# Patient Record
Sex: MALE | Race: WHITE | Employment: STUDENT | URBAN - METROPOLITAN AREA
[De-identification: names, ages, dates, MRNs, and addresses within clinical notes are randomized per-mention and may not be internally consistent; named-entity substitution may affect disease eponyms.]

---

## 2017-09-28 ENCOUNTER — OFFICE VISIT (OUTPATIENT)
Dept: PRIMARY CARE CLINIC | Age: 22
End: 2017-09-28

## 2017-09-28 VITALS
DIASTOLIC BLOOD PRESSURE: 70 MMHG | HEART RATE: 70 BPM | RESPIRATION RATE: 14 BRPM | HEIGHT: 70 IN | SYSTOLIC BLOOD PRESSURE: 128 MMHG | TEMPERATURE: 98.5 F | BODY MASS INDEX: 27.35 KG/M2 | WEIGHT: 191 LBS

## 2017-09-28 DIAGNOSIS — F98.8 ADD (ATTENTION DEFICIT DISORDER): Primary | ICD-10-CM

## 2017-09-28 PROCEDURE — 99213 OFFICE O/P EST LOW 20 MIN: CPT | Performed by: FAMILY MEDICINE

## 2017-09-28 ASSESSMENT — ENCOUNTER SYMPTOMS
EYE PAIN: 0
COLOR CHANGE: 0
FACIAL SWELLING: 0
EYE DISCHARGE: 0
CONSTIPATION: 0
DIARRHEA: 0
PHOTOPHOBIA: 0
EYE REDNESS: 0
APNEA: 0
CHEST TIGHTNESS: 0
STRIDOR: 0
NAUSEA: 0
CHOKING: 0
ABDOMINAL PAIN: 0
WHEEZING: 0

## 2017-09-28 ASSESSMENT — PATIENT HEALTH QUESTIONNAIRE - PHQ9
SUM OF ALL RESPONSES TO PHQ QUESTIONS 1-9: 0
2. FEELING DOWN, DEPRESSED OR HOPELESS: 0
SUM OF ALL RESPONSES TO PHQ9 QUESTIONS 1 & 2: 0
1. LITTLE INTEREST OR PLEASURE IN DOING THINGS: 0

## 2017-10-12 ENCOUNTER — TELEPHONE (OUTPATIENT)
Dept: PRIMARY CARE CLINIC | Age: 22
End: 2017-10-12

## 2017-10-12 RX ORDER — DEXTROAMPHETAMINE SACCHARATE, AMPHETAMINE ASPARTATE MONOHYDRATE, DEXTROAMPHETAMINE SULFATE AND AMPHETAMINE SULFATE 5; 5; 5; 5 MG/1; MG/1; MG/1; MG/1
20 CAPSULE, EXTENDED RELEASE ORAL EVERY MORNING
Qty: 30 CAPSULE | Refills: 0 | Status: SHIPPED | OUTPATIENT
Start: 2017-10-12 | End: 2019-02-12

## 2017-10-12 NOTE — TELEPHONE ENCOUNTER
Pt's mother is calling. Pt needs a prior auth for Vyvanse. Even with the coupon, it's over $100/mo. Ralph Terrance is having a very hard time focusing in school, she would like to get him back on Adderall until we can get a PA for the Vyvanse so that he can be on something, he's struggling.

## 2017-10-13 ENCOUNTER — TELEPHONE (OUTPATIENT)
Dept: PRIMARY CARE CLINIC | Age: 22
End: 2017-10-13

## 2017-10-17 ENCOUNTER — TELEPHONE (OUTPATIENT)
Dept: PRIMARY CARE CLINIC | Age: 22
End: 2017-10-17

## 2017-10-17 NOTE — TELEPHONE ENCOUNTER
Outcome   Approvedtoday   CaseId:[id number];Product Name:ST: ADHD Stimulants GF - BRAND NAME [Adderall XR, Adzenys XR-ODT, Aptensio XR, Concerta, Cotempla XR-ODT, Daytrana, Dexedrine Spansules, Dyanavel XR, Focalin XR, Metadate CD, Mydayis, QuilliChew ER, Quillivant XR, Ritalin LA, Ritalin SR, Vyvanse] - MCKENNA;Status:Approved; Coverage Start Date:09/17/2017;Coverage End Date:10/17/2018;   DrugVyvanse 40MG capsules   [employer] Electronic PA Form   Original Claim 40 [facility]

## 2017-11-30 RX ORDER — DEXTROAMPHETAMINE SACCHARATE, AMPHETAMINE ASPARTATE, DEXTROAMPHETAMINE SULFATE AND AMPHETAMINE SULFATE 5; 5; 5; 5 MG/1; MG/1; MG/1; MG/1
20 TABLET ORAL DAILY
Qty: 30 TABLET | Refills: 0 | Status: CANCELLED | OUTPATIENT
Start: 2018-01-30

## 2017-11-30 RX ORDER — DEXTROAMPHETAMINE SACCHARATE, AMPHETAMINE ASPARTATE, DEXTROAMPHETAMINE SULFATE AND AMPHETAMINE SULFATE 5; 5; 5; 5 MG/1; MG/1; MG/1; MG/1
20 TABLET ORAL DAILY
Qty: 30 TABLET | Refills: 0 | Status: CANCELLED | OUTPATIENT
Start: 2017-12-30

## 2017-11-30 RX ORDER — DEXTROAMPHETAMINE SACCHARATE, AMPHETAMINE ASPARTATE, DEXTROAMPHETAMINE SULFATE AND AMPHETAMINE SULFATE 5; 5; 5; 5 MG/1; MG/1; MG/1; MG/1
20 TABLET ORAL DAILY
Qty: 30 TABLET | Refills: 0 | Status: CANCELLED | OUTPATIENT
Start: 2017-11-30

## 2017-11-30 NOTE — TELEPHONE ENCOUNTER
Unable to leave a message. Voicemail full. Per Dr. Jami Nagy, find out if patient is taking Vyvanse or Adderall. Last script was Adderall prescribed 10/12/17. [place] does not show that this script was filled. [place] shows Vyvanse script filled on 10/18/17. Telephone message from 10/17/17 shows that prior auth was approved for Vyvanse as well. Dr. Jami Nagy prefers Vyvanse over Adderall.

## 2017-11-30 NOTE — TELEPHONE ENCOUNTER
His mom was confused and she had called but when she asked Rosa Rodriguez it was the vyvanse he needs. She said she is so sorry. Her # is [phone].

## 2019-02-12 ENCOUNTER — OFFICE VISIT (OUTPATIENT)
Dept: PRIMARY CARE CLINIC | Age: 24
End: 2019-02-12
Payer: COMMERCIAL

## 2019-02-12 VITALS
DIASTOLIC BLOOD PRESSURE: 76 MMHG | SYSTOLIC BLOOD PRESSURE: 114 MMHG | WEIGHT: 182 LBS | BODY MASS INDEX: 26.05 KG/M2 | HEIGHT: 70 IN | TEMPERATURE: 97.9 F | HEART RATE: 64 BPM | OXYGEN SATURATION: 98 %

## 2019-02-12 DIAGNOSIS — H60.12 CELLULITIS OF LEFT EARLOBE: ICD-10-CM

## 2019-02-12 DIAGNOSIS — F98.8 ATTENTION DEFICIT DISORDER, UNSPECIFIED HYPERACTIVITY PRESENCE: Primary | ICD-10-CM

## 2019-02-12 DIAGNOSIS — H60.02 ABSCESS OF LEFT EXTERNAL EAR: ICD-10-CM

## 2019-02-12 PROCEDURE — 99214 OFFICE O/P EST MOD 30 MIN: CPT | Performed by: FAMILY MEDICINE

## 2019-02-12 PROCEDURE — 96372 THER/PROPH/DIAG INJ SC/IM: CPT | Performed by: FAMILY MEDICINE

## 2019-02-12 RX ORDER — CEFTRIAXONE 1 G/1
1 INJECTION, POWDER, FOR SOLUTION INTRAMUSCULAR; INTRAVENOUS ONCE
Status: COMPLETED | OUTPATIENT
Start: 2019-02-12 | End: 2019-02-12

## 2019-02-12 RX ORDER — AMOXICILLIN AND CLAVULANATE POTASSIUM 875; 125 MG/1; MG/1
1 TABLET, FILM COATED ORAL 2 TIMES DAILY
Qty: 20 TABLET | Refills: 0 | Status: SHIPPED | OUTPATIENT
Start: 2019-02-12 | End: 2019-02-22

## 2019-02-12 RX ADMIN — CEFTRIAXONE 1 G: 1 INJECTION, POWDER, FOR SOLUTION INTRAMUSCULAR; INTRAVENOUS at 16:20

## 2019-02-12 ASSESSMENT — ENCOUNTER SYMPTOMS
PHOTOPHOBIA: 0
NAUSEA: 0
STRIDOR: 0
WHEEZING: 0
EYE PAIN: 0
COLOR CHANGE: 0
FACIAL SWELLING: 0
EYE REDNESS: 0
CHEST TIGHTNESS: 0
ABDOMINAL PAIN: 0
DIARRHEA: 0
EYE DISCHARGE: 0
APNEA: 0
CHOKING: 0
CONSTIPATION: 0

## 2019-02-12 ASSESSMENT — PATIENT HEALTH QUESTIONNAIRE - PHQ9
2. FEELING DOWN, DEPRESSED OR HOPELESS: 0
SUM OF ALL RESPONSES TO PHQ9 QUESTIONS 1 & 2: 0
SUM OF ALL RESPONSES TO PHQ QUESTIONS 1-9: 0
1. LITTLE INTEREST OR PLEASURE IN DOING THINGS: 0
SUM OF ALL RESPONSES TO PHQ QUESTIONS 1-9: 0

## 2019-03-04 ENCOUNTER — TELEPHONE (OUTPATIENT)
Dept: PRIMARY CARE CLINIC | Age: 24
End: 2019-03-04

## 2019-03-04 DIAGNOSIS — F90.9 ATTENTION DEFICIT HYPERACTIVITY DISORDER (ADHD), UNSPECIFIED ADHD TYPE: Primary | ICD-10-CM

## 2019-03-08 RX ORDER — METHYLPHENIDATE HYDROCHLORIDE 36 MG/1
36 TABLET ORAL DAILY
Qty: 30 TABLET | Refills: 0 | Status: SHIPPED | OUTPATIENT
Start: 2019-03-08 | End: 2019-04-07

## 2023-06-26 DIAGNOSIS — L23.7 POISON IVY: ICD-10-CM

## 2023-06-26 NOTE — TELEPHONE ENCOUNTER
PT HAS POISON IVY ON FACE, NECK AND HANDS AND IS REQUESTING A CREAM OR AN ANTIBIOTIC, ANYTHING THAT WILL HELP WITH ITCHING AND MAKE IT GO AWAY.  [facility] [place] ON [address]  [address] [address]

## 2023-06-27 RX ORDER — PREDNISONE 10 MG/1
TABLET ORAL
Qty: 20 TABLET | Refills: 0 | Status: SHIPPED | OUTPATIENT
Start: 2023-06-27

## 2023-06-27 RX ORDER — LEVOCETIRIZINE DIHYDROCHLORIDE 5 MG/1
5 TABLET, FILM COATED ORAL EVERY EVENING
Qty: 30 TABLET | Refills: 11 | Status: SHIPPED | OUTPATIENT
Start: 2023-06-27 | End: 2024-06-26

## 2024-07-01 PROBLEM — F98.8 ADD (ATTENTION DEFICIT DISORDER): Status: ACTIVE | Noted: 2017-09-28

## 2024-07-01 PROBLEM — M92.523 BILATERAL OSGOOD-SCHLATTER'S DISEASE: Status: ACTIVE | Noted: 2024-07-01

## 2024-07-02 ENCOUNTER — APPOINTMENT (OUTPATIENT)
Dept: PRIMARY CARE | Facility: CLINIC | Age: 29
End: 2024-07-02
Payer: COMMERCIAL

## 2024-07-02 VITALS
WEIGHT: 196 LBS | BODY MASS INDEX: 28.06 KG/M2 | OXYGEN SATURATION: 99 % | SYSTOLIC BLOOD PRESSURE: 126 MMHG | HEART RATE: 102 BPM | HEIGHT: 70 IN | DIASTOLIC BLOOD PRESSURE: 74 MMHG

## 2024-07-02 DIAGNOSIS — F90.0 ATTENTION DEFICIT HYPERACTIVITY DISORDER (ADHD), PREDOMINANTLY INATTENTIVE TYPE: ICD-10-CM

## 2024-07-02 DIAGNOSIS — L72.3 INFLAMED SEBACEOUS CYST: Primary | ICD-10-CM

## 2024-07-02 PROCEDURE — 99213 OFFICE O/P EST LOW 20 MIN: CPT | Performed by: FAMILY MEDICINE

## 2024-07-02 RX ORDER — CEPHALEXIN 500 MG/1
500 CAPSULE ORAL 3 TIMES DAILY
Qty: 42 CAPSULE | Refills: 0 | Status: SHIPPED | OUTPATIENT
Start: 2024-07-02 | End: 2024-07-16

## 2024-07-02 RX ORDER — LISDEXAMFETAMINE DIMESYLATE 30 MG/1
30 CAPSULE ORAL EVERY MORNING
Qty: 30 CAPSULE | Refills: 0 | Status: SHIPPED | OUTPATIENT
Start: 2024-07-02 | End: 2024-08-01

## 2024-07-02 ASSESSMENT — PATIENT HEALTH QUESTIONNAIRE - PHQ9
2. FEELING DOWN, DEPRESSED OR HOPELESS: NOT AT ALL
SUM OF ALL RESPONSES TO PHQ9 QUESTIONS 1 AND 2: 0
1. LITTLE INTEREST OR PLEASURE IN DOING THINGS: NOT AT ALL

## 2024-07-02 ASSESSMENT — ENCOUNTER SYMPTOMS
SLEEP DISTURBANCE: 0
SPEECH DIFFICULTY: 0
CONSTIPATION: 0
CONFUSION: 0
OCCASIONAL FEELINGS OF UNSTEADINESS: 0
FEVER: 0
FATIGUE: 0
DEPRESSION: 0
STRIDOR: 0
ABDOMINAL DISTENTION: 0
ADENOPATHY: 0
RECTAL PAIN: 0
HEMATURIA: 0
CHEST TIGHTNESS: 0
SHORTNESS OF BREATH: 0
MYALGIAS: 0
COLOR CHANGE: 0
DYSURIA: 0
POLYDIPSIA: 0
SEIZURES: 0
PALPITATIONS: 0
NERVOUS/ANXIOUS: 0
DECREASED CONCENTRATION: 0
ACTIVITY CHANGE: 0
POLYPHAGIA: 0
ARTHRALGIAS: 0
DIARRHEA: 0
LOSS OF SENSATION IN FEET: 0
AGITATION: 0
EYE PAIN: 0
CONSTITUTIONAL NEGATIVE: 1
DIZZINESS: 0
SORE THROAT: 0
FLANK PAIN: 0
PHOTOPHOBIA: 0
ABDOMINAL PAIN: 0
RHINORRHEA: 0
APPETITE CHANGE: 0
SINUS PRESSURE: 0
HEADACHES: 0
SINUS PAIN: 0
TROUBLE SWALLOWING: 0
DYSPHORIC MOOD: 0
COUGH: 0
NECK STIFFNESS: 0
BLOOD IN STOOL: 0

## 2024-07-02 NOTE — PROGRESS NOTES
Subjective   Patient ID: Barrie Poe is a 29 y.o. male who presents for Mass and ADHD.    Patient presents today for evaluation of possible cyst on left ear. Onset was several years ago. Had it drained approximately 2 years ago. Complains of redness, increase in size, and pain.     Patient would like to discuss restarting Vyvanse. It has been a few years since being on medication. Does not remember the dose. States he is having difficulty concentrating on studying for this big test he has coming up.       Review of Systems   Constitutional: Negative.  Negative for activity change, appetite change, fatigue and fever.   HENT:  Negative for congestion, dental problem, ear discharge, ear pain, mouth sores, rhinorrhea, sinus pressure, sinus pain, sore throat, tinnitus and trouble swallowing.    Eyes:  Negative for photophobia, pain and visual disturbance.   Respiratory:  Negative for cough, chest tightness, shortness of breath and stridor.    Cardiovascular:  Negative for chest pain and palpitations.   Gastrointestinal:  Negative for abdominal distention, abdominal pain, blood in stool, constipation, diarrhea and rectal pain.   Endocrine: Negative for cold intolerance, heat intolerance, polydipsia, polyphagia and polyuria.   Genitourinary:  Negative for dysuria, flank pain, hematuria and urgency.   Musculoskeletal:  Negative for arthralgias, gait problem, myalgias and neck stiffness.   Skin:  Negative for color change and rash.        Left preauricular cyst without tenderness   Allergic/Immunologic: Negative for environmental allergies and food allergies.   Neurological:  Negative for dizziness, seizures, syncope, speech difficulty and headaches.   Hematological:  Negative for adenopathy.   Psychiatric/Behavioral:  Negative for agitation, confusion, decreased concentration, dysphoric mood and sleep disturbance. The patient is not nervous/anxious.        Objective   /74 (BP Location: Right arm, Patient  "Position: Sitting, BP Cuff Size: Adult)   Pulse 102   Ht 1.778 m (5' 10\")   Wt 88.9 kg (196 lb)   SpO2 99%   BMI 28.12 kg/m²     Physical Exam  Vitals reviewed.   Constitutional:       General: He is not in acute distress.     Appearance: Normal appearance. He is not ill-appearing or diaphoretic.   HENT:      Head: Normocephalic.      Right Ear: Tympanic membrane and external ear normal.      Left Ear: Tympanic membrane and external ear normal.      Nose: Nose normal. No congestion.      Mouth/Throat:      Pharynx: No posterior oropharyngeal erythema.   Eyes:      General:         Right eye: No discharge.         Left eye: No discharge.      Extraocular Movements: Extraocular movements intact.      Conjunctiva/sclera: Conjunctivae normal.      Pupils: Pupils are equal, round, and reactive to light.   Cardiovascular:      Rate and Rhythm: Normal rate and regular rhythm.      Pulses: Normal pulses.      Heart sounds: Normal heart sounds. No murmur heard.  Pulmonary:      Effort: Pulmonary effort is normal. No respiratory distress.      Breath sounds: Normal breath sounds. No wheezing or rales.   Chest:      Chest wall: No tenderness.   Abdominal:      General: Abdomen is flat. Bowel sounds are normal. There is no distension.      Palpations: There is no mass.      Tenderness: There is no abdominal tenderness. There is no guarding.   Musculoskeletal:         General: No tenderness. Normal range of motion.      Cervical back: Normal range of motion and neck supple. No tenderness.      Right lower leg: No edema.      Left lower leg: No edema.   Skin:     General: Skin is dry.      Coloration: Skin is not jaundiced.      Findings: Lesion present. No bruising, erythema or rash.      Comments: 2.0 x 2.5 left preauricular sebaceous cyst without inflammation or pointing   Neurological:      General: No focal deficit present.      Mental Status: He is alert and oriented to person, place, and time. Mental status is at " baseline.      Cranial Nerves: No cranial nerve deficit.      Sensory: No sensory deficit.      Coordination: Coordination normal.      Gait: Gait normal.   Psychiatric:         Mood and Affect: Mood normal.         Thought Content: Thought content normal.         Judgment: Judgment normal.         Assessment/Plan   Problem List Items Addressed This Visit             ICD-10-CM    ADD (attention deficit disorder) F98.8    Relevant Medications    lisdexamfetamine (Vyvanse) 30 mg capsule     Other Visit Diagnoses         Codes    Inflamed sebaceous cyst    -  Primary L72.3          Scribe Attestation  By signing my name below, I, Moncho Collier DO , Scribe   attest that this documentation has been prepared under the direction and in the presence of Moncho Collier DO.    Provider Attestation - Scribe documentation    All medical record entries made by the Scribe were at my direction and personally dictated by me. I have reviewed the chart and agree that the record accurately reflects my personal performance of the history, physical exam, discussion and plan.

## 2024-07-02 NOTE — PATIENT INSTRUCTIONS
Follow up in 3 months    Continue current medications and therapy for chronic medical conditions.    Patient was advised importance of proper diet/nutrition in addition adequate hydration. Patient was encouraged moderate exercise program to include 30 minutes daily for 5 days of the week or 150 minutes weekly. Patient will follow-up with us as scheduled.    Start keflex 500 mg three times daily    Consider the I&D/left preauricular sebaceous cyst

## 2024-08-02 ENCOUNTER — APPOINTMENT (OUTPATIENT)
Dept: PRIMARY CARE | Facility: CLINIC | Age: 29
End: 2024-08-02
Payer: COMMERCIAL

## 2024-11-27 DIAGNOSIS — F90.0 ATTENTION DEFICIT HYPERACTIVITY DISORDER (ADHD), PREDOMINANTLY INATTENTIVE TYPE: ICD-10-CM

## 2024-11-27 RX ORDER — LISDEXAMFETAMINE DIMESYLATE 30 MG/1
30 CAPSULE ORAL EVERY MORNING
Qty: 30 CAPSULE | Refills: 0 | Status: SHIPPED | OUTPATIENT
Start: 2024-11-27 | End: 2024-12-27

## 2024-11-27 NOTE — TELEPHONE ENCOUNTER
[id number] - [facility] [address] [place] [address] [address]   Pt needs refill on  lisdexamfetamine (Vyvanse) 30 mg capsule

## 2025-05-02 ENCOUNTER — OFFICE VISIT (OUTPATIENT)
Dept: PRIMARY CARE | Facility: CLINIC | Age: 30
End: 2025-05-02
Payer: COMMERCIAL

## 2025-05-02 VITALS
HEIGHT: 70 IN | TEMPERATURE: 98.5 F | DIASTOLIC BLOOD PRESSURE: 80 MMHG | WEIGHT: 205 LBS | SYSTOLIC BLOOD PRESSURE: 129 MMHG | BODY MASS INDEX: 29.35 KG/M2 | RESPIRATION RATE: 16 BRPM | OXYGEN SATURATION: 99 % | HEART RATE: 82 BPM

## 2025-05-02 DIAGNOSIS — R35.0 FREQUENCY OF URINATION: ICD-10-CM

## 2025-05-02 DIAGNOSIS — R10.2 PERINEUM PAIN, MALE: ICD-10-CM

## 2025-05-02 DIAGNOSIS — R39.9 SYMPTOMS OF URINARY TRACT INFECTION: Primary | ICD-10-CM

## 2025-05-02 LAB
POC APPEARANCE, URINE: CLEAR
POC BILIRUBIN, URINE: NEGATIVE
POC BLOOD, URINE: NEGATIVE
POC COLOR, URINE: NORMAL
POC GLUCOSE, URINE: NEGATIVE MG/DL
POC KETONES, URINE: NEGATIVE MG/DL
POC LEUKOCYTES, URINE: NEGATIVE
POC NITRITE,URINE: NEGATIVE
POC PH, URINE: 7.5 PH
POC PROTEIN, URINE: NEGATIVE MG/DL
POC SPECIFIC GRAVITY, URINE: 1.01
POC UROBILINOGEN, URINE: 0.2 EU/DL

## 2025-05-02 PROCEDURE — 99213 OFFICE O/P EST LOW 20 MIN: CPT | Performed by: NURSE PRACTITIONER

## 2025-05-02 PROCEDURE — 81003 URINALYSIS AUTO W/O SCOPE: CPT | Performed by: NURSE PRACTITIONER

## 2025-05-02 RX ORDER — CEPHALEXIN 500 MG/1
500 CAPSULE ORAL 2 TIMES DAILY
Qty: 20 CAPSULE | Refills: 0 | Status: SHIPPED | OUTPATIENT
Start: 2025-05-02 | End: 2025-05-12

## 2025-05-02 ASSESSMENT — PATIENT HEALTH QUESTIONNAIRE - PHQ9
1. LITTLE INTEREST OR PLEASURE IN DOING THINGS: NOT AT ALL
1. LITTLE INTEREST OR PLEASURE IN DOING THINGS: NOT AT ALL
SUM OF ALL RESPONSES TO PHQ9 QUESTIONS 1 AND 2: 0
2. FEELING DOWN, DEPRESSED OR HOPELESS: NOT AT ALL
2. FEELING DOWN, DEPRESSED OR HOPELESS: NOT AT ALL
SUM OF ALL RESPONSES TO PHQ9 QUESTIONS 1 AND 2: 0

## 2025-05-02 ASSESSMENT — PAIN SCALES - GENERAL: PAINLEVEL_OUTOF10: 0-NO PAIN

## 2025-05-02 ASSESSMENT — ENCOUNTER SYMPTOMS
DEPRESSION: 0
LOSS OF SENSATION IN FEET: 0
OCCASIONAL FEELINGS OF UNSTEADINESS: 0

## 2025-05-02 NOTE — PROGRESS NOTES
"Subjective   Patient ID: Barrie Poe is a 29 y.o. male who presents for No chief complaint on file..      Symptoms: pain in between testes and anus sharp pain, pain is becoming constant, lower pelvic pain  Length of symptoms: 2 days ago  OTC:  christa with no help  Related information:    HPI     Review of Systems    Objective   /82 Comment: auto  Pulse 82   Temp 36.9 °C (98.5 °F)   Resp 16   Ht 1.778 m (5' 10\")   Wt 93 kg (205 lb)   SpO2 99%   BMI 29.41 kg/m²     Physical Exam    Assessment/Plan          "

## 2025-05-02 NOTE — PROGRESS NOTES
"Subjective   Patient ID: Barrie Poe is a 29 y.o. male who is with chief complaint of pain and tenderness on the area between the scrotums.    HPI   Patient is a 29 y.o. male who CONSULTED AT [facility] today. Patient is with symptoms of pain and tenderness on the area between the scrotums, right of midline near the right testicle. He describes the pain as sharp, 5-6/10, intermittent, and non radiating. He states that he does not recall any injury nor any particular precipitating event. Patient states symptoms has been going on for 2 days. Patient has not taken any medication for relief of symptoms. He has some urinary frequency but no other urinary complaints. He has no fever nor chills. He denies having any unprotected sexual contact.     Review of Systems  General: no weight loss, generally healthy, no fatigue  Head:  no headaches / sinus pain, no vertigo, no injury  Eyes: no diplopia, no tearing, no pain,   Ears: no change in hearing, no tinnitus, no bleeding, no vertigo  Mouth:  no dental difficulties, no gingival bleeding, no sore throat, no loss of sense of taste  Nose: no congestion, no  discharge, no bleeding, no obstruction, no loss of sense of smell  Neck: no stiffness, no pain, no tenderness, no masses, no bruit  Pulmonary: no dyspnea, no wheezing, no hemoptysis, no cough  Cardiovascular: no chest pain, no palpitations, no syncope, no orthopnea  Gastrointestinal: no change in appetite, no dysphagia, no abdominal pains, no diarrhea, no emesis, no melena  Genito Urinary: no dysuria, no urinary urgency, no nocturia, no incontinence, no change in nature of urine  Musculoskeletal: no muscle ache, no joint pain, no limitation of range of motion, no paresthesia, no numbness  Constitutional: no fever, no chills, no night sweats    Objective   /82 Comment: auto  Pulse 82   Temp 36.9 °C (98.5 °F)   Resp 16   Ht 1.778 m (5' 10\")   Wt 93 kg (205 lb)   SpO2 99%   BMI 29.41 " kg/m²     Physical Exam  General: ambulatory, in no acute distress  Head: normocephalic, no lesions  Eyes: pink palpebral conjunctiva, anicteric sclerae, PERRLA, EOM's full  Abdomen: soft, non-tender, normoactive bowel sounds, no mass palpated  : normal looking male external genitalia, both testes descended, (+) tenderness on the perineum - area between the scrotums near the right testicle,    Assessment/Plan   Problem List Items Addressed This Visit    None  Visit Diagnoses         Codes      Symptoms of urinary tract infection    -  Primary R39.9    Relevant Medications    cephalexin (Keflex) 500 mg capsule    Other Relevant Orders    POCT UA Automated manually resulted (Completed)    Urine Culture      Perineum pain, male     R10.2    Relevant Medications    cephalexin (Keflex) 500 mg capsule    Other Relevant Orders    POCT UA Automated manually resulted (Completed)    Urine Culture      Frequency of urination     R35.0    Relevant Medications    cephalexin (Keflex) 500 mg capsule    Other Relevant Orders    POCT UA Automated manually resulted (Completed)    Urine Culture        Urinalysis was done at the office today. Urinalysis result explained and discussed with patient. Urine sample submitted to laboratory for culture and sensitivity study. The laboratory examination requested were explained and discussed with patient.    DISCHARGE SUMMARY:   Patient seen and examined. Probable diagnosis, differential diagnosis, treatment, treatment options, and probable complications were discussed and explained to patient. he was to take medication/s associated with this visit. he may take over-the-counter pain and/or fever medication if needed. Advised increased oral fluid intake (2 liters of water or more per day). Reinforced to continue personal hygiene. Patient to return to clinic if there is worsening or persistence of symptoms. Patient verbalized understanding.    Patient to come back in 7 - 10 days if needed for  worsening symptoms.

## 2025-05-04 LAB — BACTERIA UR CULT: NORMAL

## 2025-05-05 ENCOUNTER — DOCUMENTATION (OUTPATIENT)
Dept: PRIMARY CARE | Facility: CLINIC | Age: 30
End: 2025-05-05
Payer: COMMERCIAL